# Patient Record
Sex: FEMALE | Race: WHITE | NOT HISPANIC OR LATINO
[De-identification: names, ages, dates, MRNs, and addresses within clinical notes are randomized per-mention and may not be internally consistent; named-entity substitution may affect disease eponyms.]

---

## 2017-07-25 ENCOUNTER — RESULT REVIEW (OUTPATIENT)
Age: 52
End: 2017-07-25

## 2018-08-23 ENCOUNTER — RESULT REVIEW (OUTPATIENT)
Age: 53
End: 2018-08-23

## 2018-11-06 ENCOUNTER — TRANSCRIPTION ENCOUNTER (OUTPATIENT)
Age: 53
End: 2018-11-06

## 2019-10-06 ENCOUNTER — RESULT REVIEW (OUTPATIENT)
Age: 54
End: 2019-10-06

## 2019-11-11 ENCOUNTER — RESULT REVIEW (OUTPATIENT)
Age: 54
End: 2019-11-11

## 2019-11-13 ENCOUNTER — INBOUND DOCUMENT (OUTPATIENT)
Age: 54
End: 2019-11-13

## 2019-12-04 ENCOUNTER — APPOINTMENT (OUTPATIENT)
Dept: OTOLARYNGOLOGY | Facility: CLINIC | Age: 54
End: 2019-12-04

## 2020-02-19 ENCOUNTER — APPOINTMENT (OUTPATIENT)
Dept: PEDIATRIC MEDICAL GENETICS | Facility: CLINIC | Age: 55
End: 2020-02-19

## 2020-02-20 LAB
ANION GAP SERPL CALC-SCNC: 17 MMOL/L
BUN SERPL-MCNC: 17 MG/DL
CALCIUM SERPL-MCNC: 9.4 MG/DL
CHLORIDE SERPL-SCNC: 104 MMOL/L
CO2 SERPL-SCNC: 22 MMOL/L
CREAT SERPL-MCNC: 0.72 MG/DL
GLUCOSE SERPL-MCNC: 69 MG/DL
POTASSIUM SERPL-SCNC: 4.2 MMOL/L
SODIUM SERPL-SCNC: 144 MMOL/L

## 2020-08-30 ENCOUNTER — RESULT REVIEW (OUTPATIENT)
Age: 55
End: 2020-08-30

## 2021-06-29 ENCOUNTER — RESULT REVIEW (OUTPATIENT)
Age: 56
End: 2021-06-29

## 2022-11-08 ENCOUNTER — APPOINTMENT (OUTPATIENT)
Dept: OBGYN | Facility: CLINIC | Age: 57
End: 2022-11-08

## 2022-11-27 ENCOUNTER — RESULT REVIEW (OUTPATIENT)
Age: 57
End: 2022-11-27

## 2022-11-28 ENCOUNTER — APPOINTMENT (OUTPATIENT)
Dept: OBGYN | Facility: CLINIC | Age: 57
End: 2022-11-28

## 2022-11-28 PROCEDURE — 76856 US EXAM PELVIC COMPLETE: CPT | Mod: 59

## 2022-11-28 PROCEDURE — 76830 TRANSVAGINAL US NON-OB: CPT

## 2022-11-28 PROCEDURE — 99396 PREV VISIT EST AGE 40-64: CPT | Mod: 25

## 2022-11-28 PROCEDURE — 81002 URINALYSIS NONAUTO W/O SCOPE: CPT

## 2023-11-30 ENCOUNTER — APPOINTMENT (OUTPATIENT)
Dept: OBGYN | Facility: CLINIC | Age: 58
End: 2023-11-30
Payer: COMMERCIAL

## 2023-11-30 PROCEDURE — 82270 OCCULT BLOOD FECES: CPT

## 2023-11-30 PROCEDURE — 81002 URINALYSIS NONAUTO W/O SCOPE: CPT

## 2023-11-30 PROCEDURE — 76830 TRANSVAGINAL US NON-OB: CPT

## 2023-11-30 PROCEDURE — 99396 PREV VISIT EST AGE 40-64: CPT | Mod: 25

## 2024-06-25 ENCOUNTER — APPOINTMENT (OUTPATIENT)
Dept: OBGYN | Facility: CLINIC | Age: 59
End: 2024-06-25
Payer: COMMERCIAL

## 2024-06-25 PROCEDURE — 76830 TRANSVAGINAL US NON-OB: CPT

## 2024-06-25 PROCEDURE — 99213 OFFICE O/P EST LOW 20 MIN: CPT | Mod: 25

## 2024-12-16 ENCOUNTER — APPOINTMENT (OUTPATIENT)
Dept: OBGYN | Facility: CLINIC | Age: 59
End: 2024-12-16

## 2025-01-08 ENCOUNTER — EMERGENCY (EMERGENCY)
Facility: HOSPITAL | Age: 60
LOS: 0 days | Discharge: ANOTHER HOSPITAL | End: 2025-01-08
Attending: STUDENT IN AN ORGANIZED HEALTH CARE EDUCATION/TRAINING PROGRAM
Payer: COMMERCIAL

## 2025-01-08 ENCOUNTER — INPATIENT (INPATIENT)
Facility: HOSPITAL | Age: 60
LOS: 1 days | Discharge: HOME | DRG: 312 | End: 2025-01-10
Attending: INTERNAL MEDICINE | Admitting: INTERNAL MEDICINE
Payer: COMMERCIAL

## 2025-01-08 VITALS
RESPIRATION RATE: 20 BRPM | HEART RATE: 69 BPM | SYSTOLIC BLOOD PRESSURE: 122 MMHG | OXYGEN SATURATION: 98 % | HEIGHT: 60 IN | WEIGHT: 111.99 LBS | TEMPERATURE: 98 F | DIASTOLIC BLOOD PRESSURE: 79 MMHG

## 2025-01-08 VITALS
WEIGHT: 113.1 LBS | SYSTOLIC BLOOD PRESSURE: 126 MMHG | DIASTOLIC BLOOD PRESSURE: 76 MMHG | TEMPERATURE: 98 F | RESPIRATION RATE: 20 BRPM | OXYGEN SATURATION: 100 % | HEART RATE: 75 BPM | HEIGHT: 60 IN

## 2025-01-08 VITALS
HEART RATE: 79 BPM | OXYGEN SATURATION: 96 % | DIASTOLIC BLOOD PRESSURE: 62 MMHG | SYSTOLIC BLOOD PRESSURE: 118 MMHG | TEMPERATURE: 98 F | RESPIRATION RATE: 20 BRPM

## 2025-01-08 DIAGNOSIS — R42 DIZZINESS AND GIDDINESS: ICD-10-CM

## 2025-01-08 DIAGNOSIS — R11.0 NAUSEA: ICD-10-CM

## 2025-01-08 DIAGNOSIS — R51.9 HEADACHE, UNSPECIFIED: ICD-10-CM

## 2025-01-08 DIAGNOSIS — R79.89 OTHER SPECIFIED ABNORMAL FINDINGS OF BLOOD CHEMISTRY: ICD-10-CM

## 2025-01-08 DIAGNOSIS — R55 SYNCOPE AND COLLAPSE: ICD-10-CM

## 2025-01-08 LAB
ALBUMIN SERPL ELPH-MCNC: 3.6 G/DL — SIGNIFICANT CHANGE UP (ref 3.3–5)
ALBUMIN SERPL ELPH-MCNC: 3.6 G/DL — SIGNIFICANT CHANGE UP (ref 3.3–5)
ALP SERPL-CCNC: 81 U/L — SIGNIFICANT CHANGE UP (ref 40–120)
ALP SERPL-CCNC: 82 U/L — SIGNIFICANT CHANGE UP (ref 40–120)
ALT FLD-CCNC: 25 U/L — SIGNIFICANT CHANGE UP (ref 10–45)
ALT FLD-CCNC: 29 U/L — SIGNIFICANT CHANGE UP (ref 12–78)
ANION GAP SERPL CALC-SCNC: 12 MMOL/L — SIGNIFICANT CHANGE UP (ref 5–17)
ANION GAP SERPL CALC-SCNC: 8 MMOL/L — SIGNIFICANT CHANGE UP (ref 5–17)
APTT BLD: 28.5 SEC — SIGNIFICANT CHANGE UP (ref 24.5–35.6)
APTT BLD: 30 SEC — SIGNIFICANT CHANGE UP (ref 24.5–35.6)
AST SERPL-CCNC: 26 U/L — SIGNIFICANT CHANGE UP (ref 15–37)
AST SERPL-CCNC: 29 U/L — SIGNIFICANT CHANGE UP (ref 10–40)
BASOPHILS # BLD AUTO: 0.05 K/UL — SIGNIFICANT CHANGE UP (ref 0–0.2)
BASOPHILS NFR BLD AUTO: 0.6 % — SIGNIFICANT CHANGE UP (ref 0–2)
BILIRUB SERPL-MCNC: 0.4 MG/DL — SIGNIFICANT CHANGE UP (ref 0.2–1.2)
BILIRUB SERPL-MCNC: 0.5 MG/DL — SIGNIFICANT CHANGE UP (ref 0.2–1.2)
BUN SERPL-MCNC: 11 MG/DL — SIGNIFICANT CHANGE UP (ref 7–23)
BUN SERPL-MCNC: 15 MG/DL — SIGNIFICANT CHANGE UP (ref 7–23)
CALCIUM SERPL-MCNC: 8.7 MG/DL — SIGNIFICANT CHANGE UP (ref 8.5–10.1)
CALCIUM SERPL-MCNC: 9.2 MG/DL — SIGNIFICANT CHANGE UP (ref 8.4–10.5)
CHLORIDE SERPL-SCNC: 106 MMOL/L — SIGNIFICANT CHANGE UP (ref 96–108)
CHLORIDE SERPL-SCNC: 109 MMOL/L — HIGH (ref 96–108)
CK MB BLD-MCNC: 1.8 % — SIGNIFICANT CHANGE UP (ref 0–3.5)
CK MB CFR SERPL CALC: 2.2 NG/ML — SIGNIFICANT CHANGE UP (ref 0.5–3.6)
CK SERPL-CCNC: 122 U/L — SIGNIFICANT CHANGE UP (ref 26–192)
CO2 SERPL-SCNC: 20 MMOL/L — LOW (ref 22–31)
CO2 SERPL-SCNC: 25 MMOL/L — SIGNIFICANT CHANGE UP (ref 22–31)
CREAT SERPL-MCNC: 0.6 MG/DL — SIGNIFICANT CHANGE UP (ref 0.5–1.3)
CREAT SERPL-MCNC: 0.76 MG/DL — SIGNIFICANT CHANGE UP (ref 0.5–1.3)
EGFR: 103 ML/MIN/1.73M2 — SIGNIFICANT CHANGE UP
EGFR: 90 ML/MIN/1.73M2 — SIGNIFICANT CHANGE UP
EOSINOPHIL # BLD AUTO: 0.06 K/UL — SIGNIFICANT CHANGE UP (ref 0–0.5)
EOSINOPHIL NFR BLD AUTO: 0.7 % — SIGNIFICANT CHANGE UP (ref 0–6)
FLUAV AG NPH QL: SIGNIFICANT CHANGE UP
FLUBV AG NPH QL: SIGNIFICANT CHANGE UP
GLUCOSE SERPL-MCNC: 115 MG/DL — HIGH (ref 70–99)
GLUCOSE SERPL-MCNC: 130 MG/DL — HIGH (ref 70–99)
HCT VFR BLD CALC: 37.3 % — SIGNIFICANT CHANGE UP (ref 34.5–45)
HGB BLD-MCNC: 12.5 G/DL — SIGNIFICANT CHANGE UP (ref 11.5–15.5)
IMM GRANULOCYTES NFR BLD AUTO: 0.6 % — SIGNIFICANT CHANGE UP (ref 0–0.9)
INR BLD: 1.01 RATIO — SIGNIFICANT CHANGE UP (ref 0.85–1.16)
INR BLD: 1.04 RATIO — SIGNIFICANT CHANGE UP (ref 0.85–1.16)
LYMPHOCYTES # BLD AUTO: 1.86 K/UL — SIGNIFICANT CHANGE UP (ref 1–3.3)
LYMPHOCYTES # BLD AUTO: 21.9 % — SIGNIFICANT CHANGE UP (ref 13–44)
MAGNESIUM SERPL-MCNC: 2 MG/DL — SIGNIFICANT CHANGE UP (ref 1.6–2.6)
MAGNESIUM SERPL-MCNC: 2.1 MG/DL — SIGNIFICANT CHANGE UP (ref 1.6–2.6)
MCHC RBC-ENTMCNC: 29.6 PG — SIGNIFICANT CHANGE UP (ref 27–34)
MCHC RBC-ENTMCNC: 33.5 G/DL — SIGNIFICANT CHANGE UP (ref 32–36)
MCV RBC AUTO: 88.2 FL — SIGNIFICANT CHANGE UP (ref 80–100)
MONOCYTES # BLD AUTO: 0.54 K/UL — SIGNIFICANT CHANGE UP (ref 0–0.9)
MONOCYTES NFR BLD AUTO: 6.3 % — SIGNIFICANT CHANGE UP (ref 2–14)
NEUTROPHILS # BLD AUTO: 5.95 K/UL — SIGNIFICANT CHANGE UP (ref 1.8–7.4)
NEUTROPHILS NFR BLD AUTO: 69.9 % — SIGNIFICANT CHANGE UP (ref 43–77)
NRBC # BLD: 0 /100 WBCS — SIGNIFICANT CHANGE UP (ref 0–0)
PLATELET # BLD AUTO: 227 K/UL — SIGNIFICANT CHANGE UP (ref 150–400)
POTASSIUM SERPL-MCNC: 3 MMOL/L — LOW (ref 3.5–5.3)
POTASSIUM SERPL-MCNC: 3.8 MMOL/L — SIGNIFICANT CHANGE UP (ref 3.5–5.3)
POTASSIUM SERPL-SCNC: 3 MMOL/L — LOW (ref 3.5–5.3)
POTASSIUM SERPL-SCNC: 3.8 MMOL/L — SIGNIFICANT CHANGE UP (ref 3.5–5.3)
PROT SERPL-MCNC: 6.9 G/DL — SIGNIFICANT CHANGE UP (ref 6–8.3)
PROT SERPL-MCNC: 7.6 GM/DL — SIGNIFICANT CHANGE UP (ref 6–8.3)
PROTHROM AB SERPL-ACNC: 11.4 SEC — SIGNIFICANT CHANGE UP (ref 9.9–13.4)
PROTHROM AB SERPL-ACNC: 11.7 SEC — SIGNIFICANT CHANGE UP (ref 9.9–13.4)
RBC # BLD: 4.23 M/UL — SIGNIFICANT CHANGE UP (ref 3.8–5.2)
RBC # FLD: 11.9 % — SIGNIFICANT CHANGE UP (ref 10.3–14.5)
RSV RNA NPH QL NAA+NON-PROBE: SIGNIFICANT CHANGE UP
SARS-COV-2 RNA SPEC QL NAA+PROBE: SIGNIFICANT CHANGE UP
SODIUM SERPL-SCNC: 139 MMOL/L — SIGNIFICANT CHANGE UP (ref 135–145)
SODIUM SERPL-SCNC: 141 MMOL/L — SIGNIFICANT CHANGE UP (ref 135–145)
TROPONIN I, HIGH SENSITIVITY RESULT: 848.8 NG/L — HIGH
TROPONIN I, HIGH SENSITIVITY RESULT: 865.5 NG/L — HIGH
WBC # BLD: 8.51 K/UL — SIGNIFICANT CHANGE UP (ref 3.8–10.5)
WBC # FLD AUTO: 8.51 K/UL — SIGNIFICANT CHANGE UP (ref 3.8–10.5)

## 2025-01-08 PROCEDURE — 99291 CRITICAL CARE FIRST HOUR: CPT

## 2025-01-08 PROCEDURE — 93010 ELECTROCARDIOGRAM REPORT: CPT

## 2025-01-08 PROCEDURE — 99285 EMERGENCY DEPT VISIT HI MDM: CPT | Mod: 25

## 2025-01-08 PROCEDURE — 71045 X-RAY EXAM CHEST 1 VIEW: CPT | Mod: 26

## 2025-01-08 PROCEDURE — 70450 CT HEAD/BRAIN W/O DYE: CPT | Mod: 26

## 2025-01-08 RX ORDER — HEPARIN SODIUM 1000 [USP'U]/ML
3200 INJECTION, SOLUTION INTRAVENOUS; SUBCUTANEOUS EVERY 6 HOURS
Refills: 0 | Status: DISCONTINUED | OUTPATIENT
Start: 2025-01-08 | End: 2025-01-08

## 2025-01-08 RX ORDER — SODIUM CHLORIDE 9 MG/ML
1000 INJECTION, SOLUTION INTRAMUSCULAR; INTRAVENOUS; SUBCUTANEOUS ONCE
Refills: 0 | Status: COMPLETED | OUTPATIENT
Start: 2025-01-08 | End: 2025-01-08

## 2025-01-08 RX ORDER — POTASSIUM CHLORIDE 600 MG/1
40 TABLET, FILM COATED, EXTENDED RELEASE ORAL ONCE
Refills: 0 | Status: COMPLETED | OUTPATIENT
Start: 2025-01-08 | End: 2025-01-08

## 2025-01-08 RX ORDER — METOCLOPRAMIDE 10 MG/1
10 TABLET ORAL ONCE
Refills: 0 | Status: COMPLETED | OUTPATIENT
Start: 2025-01-08 | End: 2025-01-08

## 2025-01-08 RX ORDER — ASPIRIN 81 MG
324 TABLET, DELAYED RELEASE (ENTERIC COATED) ORAL ONCE
Refills: 0 | Status: COMPLETED | OUTPATIENT
Start: 2025-01-08 | End: 2025-01-08

## 2025-01-08 RX ORDER — HEPARIN SODIUM 1000 [USP'U]/ML
3200 INJECTION, SOLUTION INTRAVENOUS; SUBCUTANEOUS EVERY 6 HOURS
Refills: 0 | Status: DISCONTINUED | OUTPATIENT
Start: 2025-01-08 | End: 2025-01-09

## 2025-01-08 RX ORDER — HEPARIN SODIUM 1000 [USP'U]/ML
3200 INJECTION, SOLUTION INTRAVENOUS; SUBCUTANEOUS ONCE
Refills: 0 | Status: COMPLETED | OUTPATIENT
Start: 2025-01-08 | End: 2025-01-08

## 2025-01-08 RX ORDER — HEPARIN SODIUM 1000 [USP'U]/ML
INJECTION, SOLUTION INTRAVENOUS; SUBCUTANEOUS
Qty: 25000 | Refills: 0 | Status: DISCONTINUED | OUTPATIENT
Start: 2025-01-08 | End: 2025-01-08

## 2025-01-08 RX ORDER — POTASSIUM CHLORIDE 600 MG/1
10 TABLET, FILM COATED, EXTENDED RELEASE ORAL ONCE
Refills: 0 | Status: DISCONTINUED | OUTPATIENT
Start: 2025-01-08 | End: 2025-01-08

## 2025-01-08 RX ORDER — ACETAMINOPHEN 80 MG/.8ML
750 SOLUTION/ DROPS ORAL ONCE
Refills: 0 | Status: COMPLETED | OUTPATIENT
Start: 2025-01-08 | End: 2025-01-08

## 2025-01-08 RX ORDER — HEPARIN SODIUM 1000 [USP'U]/ML
INJECTION, SOLUTION INTRAVENOUS; SUBCUTANEOUS
Qty: 25000 | Refills: 0 | Status: DISCONTINUED | OUTPATIENT
Start: 2025-01-08 | End: 2025-01-09

## 2025-01-08 RX ADMIN — HEPARIN SODIUM 3200 UNIT(S): 1000 INJECTION, SOLUTION INTRAVENOUS; SUBCUTANEOUS at 20:03

## 2025-01-08 RX ADMIN — Medication 324 MILLIGRAM(S): at 20:03

## 2025-01-08 RX ADMIN — ACETAMINOPHEN 300 MILLIGRAM(S): 80 SOLUTION/ DROPS ORAL at 17:56

## 2025-01-08 RX ADMIN — METOCLOPRAMIDE 10 MILLIGRAM(S): 10 TABLET ORAL at 17:56

## 2025-01-08 RX ADMIN — SODIUM CHLORIDE 1000 MILLILITER(S): 9 INJECTION, SOLUTION INTRAMUSCULAR; INTRAVENOUS; SUBCUTANEOUS at 17:56

## 2025-01-08 RX ADMIN — HEPARIN SODIUM 650 UNIT(S)/HR: 1000 INJECTION, SOLUTION INTRAVENOUS; SUBCUTANEOUS at 20:07

## 2025-01-08 RX ADMIN — POTASSIUM CHLORIDE 40 MILLIEQUIVALENT(S): 600 TABLET, FILM COATED, EXTENDED RELEASE ORAL at 20:03

## 2025-01-08 NOTE — ED PEDIATRIC NURSE REASSESSMENT NOTE - NS ED NURSE REASSESS COMMENT FT2
Patient APTT drawn before leaving [facility]. APTT was 30 platelets were over 200. Pharmacy called to verify heparin to continue at same rate. new APtt will be drawn at 0140 6 hours after heparin started at [place].

## 2025-01-08 NOTE — ED PROVIDER NOTE - ATTENDING CONTRIBUTION TO CARE
58 y/o female with no PMH here with syncope today fainted hit head in store, endorsed headache and dizziness, and nausea. troponin at [place] 865 CT head negative.  Was transferred For an NSTEMI on heparin signed out pending cards evaluation likely admission for further workup patient denies any chest pain shortness of breath no EKG changes noted.

## 2025-01-08 NOTE — ED ADULT NURSE NOTE - OBJECTIVE STATEMENT
59 y.o F BIB EMS as transfer from [facility] VS p/w c/o NSTEMI. A+OX4. Per pt states was [facility] this afternoon and had sudden onset dizziness a/w syncopal episode, positive head strike, negative CTs at [facility]. Reports had EKG and labs done showing elevated troponin levels and NSTEMI, sent to [facility] for further cardiology evaluation. Denies any current CP, SOB, CASTRO, f/c, n/v/d, dizziness/ lightheadedness at rest, abd pain, urinary sx. States family at home has flu, but just tested negative x2 days ago, denies any sx. HR 72 sinus rhythm on monitor, heparin started at 1940 ACS nomogram @ 6.5 mL/hr at [facility]. 20g LAC. No pertinent cardiac hx, no other complaints at this time,  at bedside, comfort and safety maintained.

## 2025-01-08 NOTE — ED ADULT NURSE NOTE - OBJECTIVE STATEMENT
Patient received A&O x4 breathing unlabored complaining of headache post syncopal episodes while at [employer] picking up her kids flu medication. As per pt's  her family has been fighting the flu and patient has been the care taker. Patient complaining of chills, afebrile at this time, placed on cardiac monitor, continuos monitoring in place.

## 2025-01-08 NOTE — ED PROVIDER NOTE - OBJECTIVE STATEMENT
58 y/o female with no PMH here with syncope today. Pt was at the store when she fainted, hitting the back of her head. Pt currently repotrs having headache, dizziness and some nausea. Denies vomiting. Pt has been having URI symptoms for the last few days. (+) sick contact at home. Denies fever, chills.  chest pain, dyspnea. 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 Denies smoking, drinking, drugs. Pt saw cardiologist few months ago with negative workup.

## 2025-01-08 NOTE — ED ADULT NURSE NOTE - NSFALLRISKASMT_ED_ALL_ED_DT
08-Jan-2025 22:00 Saucerization Excision Additional Text (Leave Blank If You Do Not Want): The margin was drawn around the clinically apparent lesion.  Incisions were then made along these lines, in a tangential fashion, to the appropriate tissue plane and the lesion was extirpated.

## 2025-01-08 NOTE — ED PROVIDER NOTE - PROGRESS NOTE DETAILS
Attending Bj Major:  Patient signed out to me, transfer for Nstemi, on hep gtt, pending labs, cards eval for admission.

## 2025-01-08 NOTE — ED PROVIDER NOTE - CLINICAL SUMMARY MEDICAL DECISION MAKING FREE TEXT BOX
60 y/o female with no PMH here with syncopal episode today. Vs stable. Pt not hypoxic or tachycardiac.    Ddx include but not limited to vasovagl syncope, arrythmia.   Will obtain cardiac labs, ekg, ct head, pain meds and ep consult. 58 y/o female with no PMH here with syncopal episode today. Vs stable. Pt not hypoxic or tachycardiac.    Ddx include but not limited to vasovagal syncope, arrythmia.   Will obtain cardiac labs, ekg, ct head, pain meds and ep consult.    labs reviewed and notable K 3.0 Troponin 898. Will replete K  Ct head no acute findings.   Transfer center called regarding ekg findings and elevated troponin. Spoke with Dr Hooper who consulted ep and does not think it's wpw but recommend transfer due to elevated troponin. Also recommend heparin and asa. 58 y/o female with no PMH here with syncopal episode today. Vs stable. Pt not hypoxic or tachycardiac.    Ddx include but not limited to vasovagal syncope, arrythmia, acs   Will obtain cardiac labs, ekg, ct head, pain meds and ep consult.    labs reviewed and notable K 3.0 Troponin 898. Will replete K  Ct head no acute findings.   Transfer center called regarding ekg findings and elevated troponin. Spoke with Dr Hooper who consulted ep and does not think it's wpw but recommend transfer due to elevated troponin. Also recommend heparin and asa.

## 2025-01-08 NOTE — ED ADULT NURSE NOTE - CADM POA PRESS ULCER
Anti-infective Withdrawal Note    Antimicrobials:  vancomycin, piperacillin-tazobactam    Reason for withdrawal:  no evidence of infection    Communicated to: SICU team at 3:50pm    The primary team will have 24 hours (or in the case of after hours, holiday or weekend, the next business day) to appeal the decision to the Antimicrobial Stewardship Physician Leader. If the Antimicrobial Stewardship Physician Leader determines that use of the antimicrobial in question is still unjustified, a formal ID consultation will be recommended.    THIS IS NOT AN INFECTIOUS DISEASES CONSULTATION No

## 2025-01-08 NOTE — ED ADULT NURSE NOTE - ED STAT RN HANDOFF DETAILS
handoff report alejo caraballo to [facility] ED ESSENCE DEAN. All pending orders endorsed, safety precautions maintained. Pt updated on plan of care.

## 2025-01-08 NOTE — ED PROVIDER NOTE - CRITICAL CARE ATTENDING CONTRIBUTION TO CARE
Seen with SHOBHA Frazier    58yo female with no pmh presenting with syncope.  Was at [employer] picking up her order talking to another customer when this happened.  Was told she lost consciousness abruptly and fell hitting head on floor.  No ac use.  Has ha, never had cp, sob, palpitations.  No seizure activity.  Never happened before.  Has been having viral symptoms similar to other family members over past few days.  ECG reading wpw but normal pr, delta wave not very pronounced.  Will d/w ep.  Plan labs, tele, ct, reassess.    Trop 800s.  Patient cp free, no sob, normal vitals.  Doubt pe.  D/w cardiology, will transfer for further eval.    Upon my evaluation, this patient had a high probability of imminent or life-threatening deterioration due to nstemi, which required my direct attention, intervention, and personal management.  The patient has a  medical condition that impairs one or more vital organ systems.  Frequent personal assessment and adjustment of medical interventions was performed.      I have personally provided 40 minutes of critical care time exclusive of time spent on separately billable procedures. Time includes review of laboratory data, radiology results, discussion with consultants, patient and family; monitoring for potential decompensation, as well as time spent retrieving data and reviewing the chart and documenting the visit. Interventions were performed as documented above.

## 2025-01-08 NOTE — ED PROVIDER NOTE - CLINICAL SUMMARY MEDICAL DECISION MAKING FREE TEXT BOX
59 y f presented to [facility] for syncope w head strike and was found to have NSTEMI put on heparin ggt asa and transferred to [facility], cardiology team aware bedside, EKG non ischemic, low suspicion for ACS, will be adm for syncope workup.

## 2025-01-08 NOTE — ED PROVIDER NOTE - NSRISKOFTRANSFER_ED_A_ED
Use ondansetron as needed for nausea and vomiting.  Return to the emergency department for worsening symptoms, repeated episodes, abdominal pain, lightheadedness, or other concerns.  Otherwise follow-up in clinic.   Deterioration of Condition En Route/Death or Disability/Increased Pain/Transportation Risk (There is always a risk of traffic delays resulting in deterioration of condition.)

## 2025-01-08 NOTE — ED ADULT NURSE NOTE - GASTROINTESTINAL ASSESSMENT
FOLLOW-UP:  1. Please call to make a follow-up appointment with Dr. Bhakta this week. [phone]  2. Please follow up with your primary care physician in one week regarding your hospitalization. - - -

## 2025-01-08 NOTE — ED PROVIDER NOTE - OBJECTIVE STATEMENT
58 y/o female with no PMH here with syncope today fainted hit head in store, endorsed headache and dizziness, and nausea. troponin at [place] 865 CT head negative. she was given aspirin heparin and 1L IVF and reglan. on arrival denying any active sx of chest pain, n/v. EKG NSR, rsr' no interval prolongation.

## 2025-01-08 NOTE — ED PROVIDER NOTE - PHYSICAL EXAMINATION
GENERAL: well appearing in no acute distress, non-toxic appearing  HEAD: normocephalic, atraumatic  HEENT: normal conjunctiva, oral mucosa moist, uvula midline, no tonsilar exudates, neck supple, no JVD  CARDIAC: regular rate and rhythm, normal S1S2, no appreciable murmurs, 2+ pulses in UE/LE b/l  PULM: normal breath sounds, clear to ascultation bilaterally, no rales, rhonchi, wheezing  GI: abdomen nondistended, soft, nontender, no guarding, rebound tenderness  NEURO: no focal motor or sensory deficits,   MSK: no peripheral edema, no calf tenderness b/l

## 2025-01-08 NOTE — ED ADULT NURSE NOTE - NSFALLRISKINTERV_ED_ALL_ED

## 2025-01-08 NOTE — ED PROVIDER NOTE - CARE PLAN
Principal Discharge DX:	Syncope   1 Principal Discharge DX:	Syncope  Secondary Diagnosis:	Elevated troponin

## 2025-01-09 ENCOUNTER — RESULT REVIEW (OUTPATIENT)
Age: 60
End: 2025-01-09

## 2025-01-09 DIAGNOSIS — R55 SYNCOPE AND COLLAPSE: ICD-10-CM

## 2025-01-09 LAB
A1C WITH ESTIMATED AVERAGE GLUCOSE RESULT: 5.7 % — HIGH (ref 4–5.6)
A1C WITH ESTIMATED AVERAGE GLUCOSE RESULT: 5.7 % — HIGH (ref 4–5.6)
ADD ON TEST-SPECIMEN IN LAB: SIGNIFICANT CHANGE UP
ANION GAP SERPL CALC-SCNC: 11 MMOL/L — SIGNIFICANT CHANGE UP (ref 5–17)
APTT BLD: 67 SEC — HIGH (ref 24.5–35.6)
APTT BLD: 79.4 SEC — HIGH (ref 24.5–35.6)
BASOPHILS # BLD AUTO: 0.03 K/UL — SIGNIFICANT CHANGE UP (ref 0–0.2)
BASOPHILS NFR BLD AUTO: 0.3 % — SIGNIFICANT CHANGE UP (ref 0–2)
BUN SERPL-MCNC: 8 MG/DL — SIGNIFICANT CHANGE UP (ref 7–23)
CALCIUM SERPL-MCNC: 9.3 MG/DL — SIGNIFICANT CHANGE UP (ref 8.4–10.5)
CHLORIDE SERPL-SCNC: 109 MMOL/L — HIGH (ref 96–108)
CHOLEST SERPL-MCNC: 150 MG/DL — SIGNIFICANT CHANGE UP
CHOLEST SERPL-MCNC: 158 MG/DL — SIGNIFICANT CHANGE UP
CK MB BLD-MCNC: 3 % — SIGNIFICANT CHANGE UP (ref 0–3.5)
CK MB BLD-MCNC: 3.3 % — SIGNIFICANT CHANGE UP (ref 0–3.5)
CK MB CFR SERPL CALC: 3.6 NG/ML — SIGNIFICANT CHANGE UP (ref 0–3.8)
CK MB CFR SERPL CALC: 3.9 NG/ML — HIGH (ref 0–3.8)
CK SERPL-CCNC: 119 U/L — SIGNIFICANT CHANGE UP (ref 25–170)
CK SERPL-CCNC: 119 U/L — SIGNIFICANT CHANGE UP (ref 25–170)
CO2 SERPL-SCNC: 21 MMOL/L — LOW (ref 22–31)
CREAT SERPL-MCNC: 0.58 MG/DL — SIGNIFICANT CHANGE UP (ref 0.5–1.3)
EGFR: 104 ML/MIN/1.73M2 — SIGNIFICANT CHANGE UP
EOSINOPHIL # BLD AUTO: 0 K/UL — SIGNIFICANT CHANGE UP (ref 0–0.5)
EOSINOPHIL NFR BLD AUTO: 0 % — SIGNIFICANT CHANGE UP (ref 0–6)
ESTIMATED AVERAGE GLUCOSE: 117 MG/DL — HIGH (ref 68–114)
ESTIMATED AVERAGE GLUCOSE: 117 MG/DL — HIGH (ref 68–114)
GAS PNL BLDV: SIGNIFICANT CHANGE UP
GLUCOSE SERPL-MCNC: 109 MG/DL — HIGH (ref 70–99)
HCT VFR BLD CALC: 34.7 % — SIGNIFICANT CHANGE UP (ref 34.5–45)
HCT VFR BLD CALC: 35.1 % — SIGNIFICANT CHANGE UP (ref 34.5–45)
HCT VFR BLD CALC: 35.2 % — SIGNIFICANT CHANGE UP (ref 34.5–45)
HDLC SERPL-MCNC: 65 MG/DL — SIGNIFICANT CHANGE UP
HDLC SERPL-MCNC: 65 MG/DL — SIGNIFICANT CHANGE UP
HGB BLD-MCNC: 11.6 G/DL — SIGNIFICANT CHANGE UP (ref 11.5–15.5)
HGB BLD-MCNC: 11.7 G/DL — SIGNIFICANT CHANGE UP (ref 11.5–15.5)
HGB BLD-MCNC: 11.8 G/DL — SIGNIFICANT CHANGE UP (ref 11.5–15.5)
IMM GRANULOCYTES NFR BLD AUTO: 0.2 % — SIGNIFICANT CHANGE UP (ref 0–0.9)
INR BLD: 1.09 RATIO — SIGNIFICANT CHANGE UP (ref 0.85–1.16)
LIPID PNL WITH DIRECT LDL SERPL: 76 MG/DL — SIGNIFICANT CHANGE UP
LIPID PNL WITH DIRECT LDL SERPL: 82 MG/DL — SIGNIFICANT CHANGE UP
LYMPHOCYTES # BLD AUTO: 1.33 K/UL — SIGNIFICANT CHANGE UP (ref 1–3.3)
LYMPHOCYTES # BLD AUTO: 14.8 % — SIGNIFICANT CHANGE UP (ref 13–44)
MCHC RBC-ENTMCNC: 29.8 PG — SIGNIFICANT CHANGE UP (ref 27–34)
MCHC RBC-ENTMCNC: 30.1 PG — SIGNIFICANT CHANGE UP (ref 27–34)
MCHC RBC-ENTMCNC: 30.2 PG — SIGNIFICANT CHANGE UP (ref 27–34)
MCHC RBC-ENTMCNC: 33.3 G/DL — SIGNIFICANT CHANGE UP (ref 32–36)
MCHC RBC-ENTMCNC: 33.4 G/DL — SIGNIFICANT CHANGE UP (ref 32–36)
MCHC RBC-ENTMCNC: 33.5 G/DL — SIGNIFICANT CHANGE UP (ref 32–36)
MCV RBC AUTO: 89.3 FL — SIGNIFICANT CHANGE UP (ref 80–100)
MCV RBC AUTO: 90 FL — SIGNIFICANT CHANGE UP (ref 80–100)
MCV RBC AUTO: 90.1 FL — SIGNIFICANT CHANGE UP (ref 80–100)
MONOCYTES # BLD AUTO: 0.32 K/UL — SIGNIFICANT CHANGE UP (ref 0–0.9)
MONOCYTES NFR BLD AUTO: 3.6 % — SIGNIFICANT CHANGE UP (ref 2–14)
NEUTROPHILS # BLD AUTO: 7.26 K/UL — SIGNIFICANT CHANGE UP (ref 1.8–7.4)
NEUTROPHILS NFR BLD AUTO: 81.1 % — HIGH (ref 43–77)
NON HDL CHOLESTEROL: 85 MG/DL — SIGNIFICANT CHANGE UP
NON HDL CHOLESTEROL: 93 MG/DL — SIGNIFICANT CHANGE UP
NRBC # BLD: 0 /100 WBCS — SIGNIFICANT CHANGE UP (ref 0–0)
NT-PROBNP SERPL-SCNC: 198 PG/ML — SIGNIFICANT CHANGE UP (ref 0–300)
NT-PROBNP SERPL-SCNC: 457 PG/ML — HIGH (ref 0–300)
PLATELET # BLD AUTO: 200 K/UL — SIGNIFICANT CHANGE UP (ref 150–400)
PLATELET # BLD AUTO: 212 K/UL — SIGNIFICANT CHANGE UP (ref 150–400)
PLATELET # BLD AUTO: 219 K/UL — SIGNIFICANT CHANGE UP (ref 150–400)
POTASSIUM SERPL-MCNC: 3.7 MMOL/L — SIGNIFICANT CHANGE UP (ref 3.5–5.3)
POTASSIUM SERPL-SCNC: 3.7 MMOL/L — SIGNIFICANT CHANGE UP (ref 3.5–5.3)
PROTHROM AB SERPL-ACNC: 12.4 SEC — SIGNIFICANT CHANGE UP (ref 9.9–13.4)
RBC # BLD: 3.85 M/UL — SIGNIFICANT CHANGE UP (ref 3.8–5.2)
RBC # BLD: 3.91 M/UL — SIGNIFICANT CHANGE UP (ref 3.8–5.2)
RBC # BLD: 3.93 M/UL — SIGNIFICANT CHANGE UP (ref 3.8–5.2)
RBC # FLD: 11.9 % — SIGNIFICANT CHANGE UP (ref 10.3–14.5)
SODIUM SERPL-SCNC: 141 MMOL/L — SIGNIFICANT CHANGE UP (ref 135–145)
TRIGL SERPL-MCNC: 40 MG/DL — SIGNIFICANT CHANGE UP
TRIGL SERPL-MCNC: 47 MG/DL — SIGNIFICANT CHANGE UP
TROPONIN T, HIGH SENSITIVITY RESULT: 55 NG/L — HIGH (ref 0–51)
TROPONIN T, HIGH SENSITIVITY RESULT: 57 NG/L — HIGH (ref 0–51)
TROPONIN T, HIGH SENSITIVITY RESULT: 66 NG/L — HIGH (ref 0–51)
TROPONIN T, HIGH SENSITIVITY RESULT: 66 NG/L — HIGH (ref 0–51)
TSH SERPL-MCNC: 1.62 UIU/ML — SIGNIFICANT CHANGE UP (ref 0.27–4.2)
TSH SERPL-MCNC: 3.36 UIU/ML — SIGNIFICANT CHANGE UP (ref 0.27–4.2)
TSH SERPL-MCNC: 3.43 UIU/ML — SIGNIFICANT CHANGE UP (ref 0.27–4.2)
WBC # BLD: 6.38 K/UL — SIGNIFICANT CHANGE UP (ref 3.8–10.5)
WBC # BLD: 7.31 K/UL — SIGNIFICANT CHANGE UP (ref 3.8–10.5)
WBC # BLD: 8.96 K/UL — SIGNIFICANT CHANGE UP (ref 3.8–10.5)
WBC # FLD AUTO: 6.38 K/UL — SIGNIFICANT CHANGE UP (ref 3.8–10.5)
WBC # FLD AUTO: 7.31 K/UL — SIGNIFICANT CHANGE UP (ref 3.8–10.5)
WBC # FLD AUTO: 8.96 K/UL — SIGNIFICANT CHANGE UP (ref 3.8–10.5)

## 2025-01-09 PROCEDURE — 93356 MYOCRD STRAIN IMG SPCKL TRCK: CPT

## 2025-01-09 PROCEDURE — 99152 MOD SED SAME PHYS/QHP 5/>YRS: CPT

## 2025-01-09 PROCEDURE — 99253 IP/OBS CNSLTJ NEW/EST LOW 45: CPT | Mod: GC

## 2025-01-09 PROCEDURE — 99223 1ST HOSP IP/OBS HIGH 75: CPT | Mod: GC

## 2025-01-09 PROCEDURE — 93306 TTE W/DOPPLER COMPLETE: CPT | Mod: 26

## 2025-01-09 PROCEDURE — 76376 3D RENDER W/INTRP POSTPROCES: CPT | Mod: 26

## 2025-01-09 PROCEDURE — 70553 MRI BRAIN STEM W/O & W/DYE: CPT | Mod: 26

## 2025-01-09 PROCEDURE — 93458 L HRT ARTERY/VENTRICLE ANGIO: CPT | Mod: 26

## 2025-01-09 RX ORDER — ACETAMINOPHEN 80 MG/.8ML
650 SOLUTION/ DROPS ORAL EVERY 6 HOURS
Refills: 0 | Status: DISCONTINUED | OUTPATIENT
Start: 2025-01-09 | End: 2025-01-10

## 2025-01-09 RX ORDER — HEPARIN SODIUM 1000 [USP'U]/ML
INJECTION, SOLUTION INTRAVENOUS; SUBCUTANEOUS
Qty: 25000 | Refills: 0 | Status: DISCONTINUED | OUTPATIENT
Start: 2025-01-09 | End: 2025-01-09

## 2025-01-09 RX ORDER — ASPIRIN 81 MG
81 TABLET, DELAYED RELEASE (ENTERIC COATED) ORAL DAILY
Refills: 0 | Status: DISCONTINUED | OUTPATIENT
Start: 2025-01-09 | End: 2025-01-10

## 2025-01-09 RX ADMIN — HEPARIN SODIUM 650 UNIT(S)/HR: 1000 INJECTION, SOLUTION INTRAVENOUS; SUBCUTANEOUS at 07:00

## 2025-01-09 RX ADMIN — Medication 81 MILLIGRAM(S): at 10:22

## 2025-01-09 RX ADMIN — HEPARIN SODIUM 650 UNIT(S)/HR: 1000 INJECTION, SOLUTION INTRAVENOUS; SUBCUTANEOUS at 00:55

## 2025-01-09 RX ADMIN — HEPARIN SODIUM 650 UNIT(S)/HR: 1000 INJECTION, SOLUTION INTRAVENOUS; SUBCUTANEOUS at 09:49

## 2025-01-09 RX ADMIN — HEPARIN SODIUM 650 UNIT(S)/HR: 1000 INJECTION, SOLUTION INTRAVENOUS; SUBCUTANEOUS at 03:02

## 2025-01-09 RX ADMIN — ACETAMINOPHEN 650 MILLIGRAM(S): 80 SOLUTION/ DROPS ORAL at 10:22

## 2025-01-09 RX ADMIN — HEPARIN SODIUM UNIT(S)/HR: 1000 INJECTION, SOLUTION INTRAVENOUS; SUBCUTANEOUS at 03:02

## 2025-01-09 NOTE — H&P ADULT - NSHPLABSRESULTS_GEN_ALL_CORE
11.7   6.38  )-----------( 200      ( 09 Jan 2025 08:50 )             35.1       01-09    141  |  109[H]  |  8   ----------------------------<  109[H]  3.7   |  21[L]  |  0.58    Ca    9.3      09 Jan 2025 06:16  Mg     2.0     01-08    TPro  6.9  /  Alb  3.6  /  TBili  0.4  /  DBili  x   /  AST  29  /  ALT  25  /  AlkPhos  82  01-08              Urinalysis Basic - ( 09 Jan 2025 06:16 )    Color: x / Appearance: x / SG: x / pH: x  Gluc: 109 mg/dL / Ketone: x  / Bili: x / Urobili: x   Blood: x / Protein: x / Nitrite: x   Leuk Esterase: x / RBC: x / WBC x   Sq Epi: x / Non Sq Epi: x / Bacteria: x        PT/INR - ( 09 Jan 2025 02:32 )   PT: 12.4 sec;   INR: 1.09 ratio         PTT - ( 09 Jan 2025 08:50 )  PTT:67.0 sec    CARDIAC MARKERS ( 09 Jan 2025 06:16 )  x     / x     / x     / x     / 3.6 ng/mL  CARDIAC MARKERS ( 08 Jan 2025 23:30 )  x     / x     / x     / x     / 3.3 ng/mL      EKG SR T inv inf leads

## 2025-01-09 NOTE — CONSULT NOTE ADULT - ASSESSMENT
59F presenting with syncope iso dehydration. Neuro exam nonfocal. CTH neg. Given Fabry disease trait, would r/o stroke.     Impression: Syncope likely iso dehydration. R/o neurologic etiology given Fabry trait increases risk of stroke     Recommendations:   MRI brain w/wo  vEEG    Seen and d/w Dr. Mcrae

## 2025-01-09 NOTE — H&P ADULT - HISTORY OF PRESENT ILLNESS
60 y/o female with no PMH here with syncope today fainted hit head in store, endorsed headache and dizziness, and nausea. troponin at [place] 865 CT head negative. she was given aspirin heparin and 1L IVF and reglan. on arrival denying any active sx of chest pain, n/v. EKG NSR, rsr' no interval prolongation. Transferred to [facility] for further work up. s/p recent URI.  at bedside.

## 2025-01-09 NOTE — H&P ADULT - NSHPSOCIALHISTORY_GEN_ALL_CORE
Social History:    Marital Status:  (  x )    (   ) Single    (   )    (  )   Occupation: teacher aid  Lives with: (  ) alone  (  ) children   (x  ) spouse   (  ) parents  (  ) other    Substance Use (street drugs): ( x) never used  (  ) other:  Tobacco Usage:  ( x ) never smoked   (   ) former smoker   (   ) current smoker  (     ) pack years  (        ) last cigarette date  Alcohol Usage: social    (     ) Advanced Directives: (     ) None    (      ) DNR    (     ) DNI    (     ) Health Care Proxy:

## 2025-01-09 NOTE — CONSULT NOTE ADULT - SUBJECTIVE AND OBJECTIVE BOX
Cardiology Consult Note   [Please check [email or web address] password: [email or web address]" for cardiology service schedule and contact information]    HPI:  Flavia Álvarez is a 59 year old Female with no PMH who presented to [place] for syncope and found to have elevated troponin.    Patient is typically "healthy", fully functional, and without issues. Patient states ***      ROS:  General: no fatigue, no lethargy  HEENT: No cough, no congestion  CV: as per HPI  Resp: as per HPI  GI: No nausea, no vomiting  MSK: no extremity pain, no decreased ROM  Skin: no rashes, no bruising  Neuro: no confusion, no focal deficits  Psych: no significant anxiety, no hallucinations    PAST MEDICAL & SURGICAL HISTORY:    FAMILY HISTORY:    SOCIAL HISTORY:  unchanged    MEDICATIONS:  heparin   Injectable 3200 Unit(s) IV Push every 6 hours PRN  heparin  Infusion.  Unit(s)/Hr IV Continuous <Continuous>                    -------------------------------------------------------------------------------------------  PHYSICAL EXAM:  T(C): 36.8 (01-08-25 @ 21:34), Max: 36.8 (01-08-25 @ 21:34)  HR: 72 (01-08-25 @ 22:42) (72 - 75)  BP: 98/61 (01-08-25 @ 22:42) (98/61 - 126/76)  RR: 17 (01-08-25 @ 22:42) (17 - 20)  SpO2: 100% (01-08-25 @ 22:42) (100% - 100%)  Wt(kg): --  I&O's Summary      GENERAL: Patient is alert, awake, and in no acute distress  HEENT: Moist mucous membranes. No conjunctival injection. No JVD  CHEST/LUNG: Clear to auscultation bilaterally; No wheezing. Unlabored respirations.  HEART: Regular rate and rhythm; No murmurs. Radial pulses 2+.  ABDOMEN: Bowel sounds present; Soft, Nontender, Nondistended.   EXTREMITIES:  No lower extremity tenderness. No lower extremity edema.  NEURO: Aox3, no focal deficits    -------------------------------------------------------------------------------------------  LABS:                          11.8   8.96  )-----------( 219      ( 08 Jan 2025 23:30 )             35.2     01-08    141  |  109[H]  |  11  ----------------------------<  115[H]  3.8   |  20[L]  |  0.60    Ca    9.2      08 Jan 2025 23:30  Mg     2.0     01-08    TPro  6.9  /  Alb  3.6  /  TBili  0.4  /  DBili  x   /  AST  29  /  ALT  25  /  AlkPhos  82  01-08    PT/INR - ( 09 Jan 2025 02:32 )   PT: 12.4 sec;   INR: 1.09 ratio         PTT - ( 09 Jan 2025 02:32 )  PTT:79.4 sec  CARDIAC MARKERS ( 09 Jan 2025 02:32 )  55 ng/L / x     / x     / x     / x     / x      CARDIAC MARKERS ( 08 Jan 2025 23:30 )  57 ng/L / x     / x     / x     / x     / 3.3 ng/mL              -------------------------------------------------------------------------------------------    -------------------------------------------------------------------------------------------    Assessment and Recommendations:        Recs:  - load patient w/ ASA 325mg if not done, then continue with ASA 81mg daily  - load patient w/ ticagrelor 180mg and then continue with ticagrelor 90 mg BID  - give heparin bolus and gtt (use ACS order set)  - trend troponins q2-4 hours to peak, obtain CK, CKMB, CKMB%  - obtain proBNP  - repeat ECG with next troponin  - atorvastatin 80 mg PO daily  - obtain formal TTE  - please obtain A1C, Lipid profile, LPa, TSH  - replete lytes for K >4.0, Mg >2.0  - start the patient on metoprolol tartrate 12.5mg BID and uptitrate as tolerated  - will consider starting ACE/ARB pending clinical course and plan for ACS management  - monitor on telemetry  - cardiac rehab on discharge  - nutrition consult for risk modification   - smoking cessation counseling  - please notify cardiology team if worsening chest pain, hemodynamic instability  - maintain active T&S, Hgb > 8; will monitor for s/s of bleeding           These are preliminary recommendations. Please see attending attestation for final recommendations.  Cardiology Consult Note   [Please check [email or web address] password: [email or web address]" for cardiology service schedule and contact information]    HPI:  Flavia Álvarez is a 59 year old Female with no PMH who presented to [place] for syncope and found to have elevated troponin.    Patient is typically "healthy", fully functional, and without issues, but she states for the past couple of weeks she has been having dry cough, rhinorrhea, congestion, and overall fatigue. Thinks she had a viral illness during this time, and her PO intake has been a bit decreased. This AM she didn't drink or eat anything and was feeling a bit woozy when she went to the [facility]. While waiting for her food, she felt a little lightheaded and next thing she knew, she was on the floor, having hit her head and lost consciousness. Unknown how long she was down for. EMS was called and patient went to [place] where she had CT head that was negative, ECG which documented "WPW" however I discussed this with the Electrophysiologist on call who did not think there was any pre-excitation. ECG did show TWI in anterolateral leads. Patient had a troponin that was elevated at 848 and so Cardiology at [facility] was called.     Given possibility that ACS lead to her syncopal episode, decision made to start heparin gtt and load with Aspirin. A repeat troponin and a CK + CPK% were requested prior to transfer to [facility]. Repeat troponin was also ~850 and CPK% <3. Upon arrival at [facility], patient is hemodynamically stable and asymptomatic. ECG done on arrival with similar unchanged TWI in anterolateral leads but now more diffuse TWI including the inferior leads. She denies any shortness of breath, orthopnea, leg swelling, leg pain, fevers, chills, chest pain, abdominal pain, neurologic deficits, history of seizures, history of exertional syncope.     Of note, patient has Fabry gene trait but not the disease.    ROS:  General: + fatigue, no lethargy  HEENT: + cough, no congestion  CV: as per HPI  Resp: as per HPI  GI: No nausea, no vomiting  MSK: no extremity pain, no decreased ROM  Skin: no rashes, no bruising  Neuro: no confusion, no focal deficits  Psych: no significant anxiety, no hallucinations    PAST MEDICAL & SURGICAL HISTORY:    FAMILY HISTORY:     SOCIAL HISTORY:  No EtOH, illicit drug, or tobacco use     MEDICATIONS:  heparin   Injectable 3200 Unit(s) IV Push every 6 hours PRN  heparin  Infusion.  Unit(s)/Hr IV Continuous <Continuous>                    -------------------------------------------------------------------------------------------  PHYSICAL EXAM:  T(C): 36.8 (01-08-25 @ 21:34), Max: 36.8 (01-08-25 @ 21:34)  HR: 72 (01-08-25 @ 22:42) (72 - 75)  BP: 98/61 (01-08-25 @ 22:42) (98/61 - 126/76)  RR: 17 (01-08-25 @ 22:42) (17 - 20)  SpO2: 100% (01-08-25 @ 22:42) (100% - 100%)  Wt(kg): --  I&O's Summary      GENERAL: Patient is alert, awake, and in no acute distress  HEENT: Moist mucous membranes. No conjunctival injection. No JVD.   CHEST/LUNG: Clear to auscultation bilaterally; No wheezing. Unlabored respirations.  HEART: Regular rate and rhythm; No murmurs. Radial pulses 2+.  ABDOMEN: Bowel sounds present; Soft, Nontender, Nondistended.   EXTREMITIES:  No lower extremity tenderness. No lower extremity edema.  NEURO: Aox3, no focal deficits. Feels lightheaded when turning her head to the left.     -------------------------------------------------------------------------------------------  LABS:                          11.8   8.96  )-----------( 219      ( 08 Jan 2025 23:30 )             35.2     01-08    141  |  109[H]  |  11  ----------------------------<  115[H]  3.8   |  20[L]  |  0.60    Ca    9.2      08 Jan 2025 23:30  Mg     2.0     01-08    TPro  6.9  /  Alb  3.6  /  TBili  0.4  /  DBili  x   /  AST  29  /  ALT  25  /  AlkPhos  82  01-08    PT/INR - ( 09 Jan 2025 02:32 )   PT: 12.4 sec;   INR: 1.09 ratio         PTT - ( 09 Jan 2025 02:32 )  PTT:79.4 sec  CARDIAC MARKERS ( 09 Jan 2025 02:32 )  55 ng/L / x     / x     / x     / x     / x      CARDIAC MARKERS ( 08 Jan 2025 23:30 )  57 ng/L / x     / x     / x     / x     / 3.3 ng/mL              -------------------------------------------------------------------------------------------    -------------------------------------------------------------------------------------------

## 2025-01-09 NOTE — PATIENT PROFILE ADULT - FALL HARM RISK - HARM RISK INTERVENTIONS

## 2025-01-09 NOTE — H&P ADULT - ASSESSMENT
59 f with    Syncope  - telemetry  - cardiac enzymes  - Echo  - ASA  - Orthostatic VS  - MRI brain  - Cardiology evaluation  - Neurology evaluation    DVT prophylaxis    Further action as per clinical course     d/w patient, , ACP    Yuriy Penaloza MD phone [phone]

## 2025-01-09 NOTE — H&P ADULT - NSHPPHYSICALEXAM_GEN_ALL_CORE
PHYSICAL EXAMINATION:  Vital Signs Last 24 Hrs  T(C): 36.7 (09 Jan 2025 05:48), Max: 37.3 (09 Jan 2025 03:38)  T(F): 98 (09 Jan 2025 05:48), Max: 99.2 (09 Jan 2025 03:38)  HR: 71 (09 Jan 2025 07:05) (66 - 75)  BP: 112/66 (09 Jan 2025 07:05) (98/61 - 126/76)  BP(mean): 78 (09 Jan 2025 03:38) (78 - 78)  RR: 16 (09 Jan 2025 07:05) (15 - 20)  SpO2: 98% (09 Jan 2025 07:05) (98% - 100%)    Parameters below as of 09 Jan 2025 07:05  Patient On (Oxygen Delivery Method): room air      CAPILLARY BLOOD GLUCOSE          GENERAL: NAD   HEAD:  atraumatic, normocephalic  EYES: sclera anicteric  ENMT: mucous membranes moist  NECK: supple, No JVD  CHEST/LUNG: clear to auscultation bilaterally; no rales, rhonchi, or wheezing b/l  HEART: normal S1, S2  ABDOMEN: BS+, soft, ND, NT   EXTREMITIES:  pulses palpable; no clubbing, cyanosis, or edema b/l LEs  NEURO: awake, alert, interactive; moves all extremities  SKIN: no rashes or lesions

## 2025-01-09 NOTE — ASU PATIENT PROFILE, ADULT - FALL HARM RISK - HARM RISK INTERVENTIONS
Assistance with ambulation/Assistance OOB with selected safe patient handling equipment/Communicate Risk of Fall with Harm to all staff/Monitor gait and stability/Reinforce activity limits and safety measures with patient and family/Sit up slowly, dangle for a short time, stand at bedside before walking/Tailored Fall Risk Interventions/Visual Cue: Yellow wristband and red socks/Bed in lowest position, wheels locked, appropriate side rails in place/Call bell, personal items and telephone in reach/Instruct patient to call for assistance before getting out of bed or chair/Non-slip footwear when patient is out of bed/[place] to call system/Physically safe environment - no spills, clutter or unnecessary equipment/Purposeful Proactive Rounding/Room/bathroom lighting operational, light cord in reach

## 2025-01-09 NOTE — CONSULT NOTE ADULT - ATTENDING COMMENTS
During the evaluation, the patient's  was present, and I appreciated his involvement and support.    I interviewed the patient, discussed the case with the Resident and agree with the findings and plan as documented in the Resident's note except below.    During my evaluation, patient reported feeling much better and non focal exam.    Etiology of episode of unresponsiveness is uncertain but may be due to the syncope versus convulsive syncope. Patient will benefit from further inpatient work up to rule out treatable etiologies. Further workup and management will be guided by pending results.    [] VEEG  [] MRI Brain with seizure protocol as outpatient.  [] Syncope work up defer on primary team.  [] Continue medical management, neuro- check and fall precaution.  [] GI and DVT prophylaxis      LOC Precautions discussed:  1. No driving for 1 year from date of last seizure as per [place] law.  2. No taking a bath alone or showering with standing water > 2 inches  3. No swimming unsupervised  4. No use of automatic or semi-automatic firearms and no using other firearms unsupervised  5. No use of heavy machinery unsupervised  6. No cooking over an open flame on the front burners (back burners OK)  7. For additional recommendations please discuss with neurology or PCP as outpatient.    I discussed the diagnosis, treatment plan and prognosis with the patient. All questions and concerns were addressed. The patient demonstrated good understanding of the treatment plan.  My cumulative time taking care of this patient is 80 minutes  If you have any further questions, please do not hesitate to contact our consult service  Thank you for allowing us to participate in this patient care.
Syncope, possibly vasovagal i/s/o recent viral URI and decreased PO intake HOWEVER with the Troponin elevation, Diffuse TWI and slight elevation in AvR - will pursue with a cardiac cath

## 2025-01-09 NOTE — CONSULT NOTE ADULT - ASSESSMENT
Assessment and Recommendations:    #Syncope, possibly vasovagal i/s/o recent viral URI and decreased PO intake  #Troponin elevation, Diffuse TWI possibly 2/2 myocarditis   Patient presenting with a syncopal episode after a couple weeks of feeling malaise, cough, congestion consistent with a viral URI. She also had no PO intake the morning of the syncopal event, and she felt a bit lightheaded prior to passing out. Troponin was found to be elevated to 800s at [place] x2, here 50s x2. Given stable troponins, what sounds like viral URI, and diffuse TWI, CPK% <5, and no chest pain, suspect this is not ACS but rather could be myocarditis. However, given the risks of missing ACS, for now will continue with medical management and discuss in AM whether patient should go for [facility]. She also should be evaluated for major possible causes of syncope (vasovagal, orthostatic, arrhythmic, structural cardiac, ischemic, neurogenic, etc) given no clear etiology at this time.   Recs:  - s/p ASA load, continue with ASA 81mg daily  - on heparin gtt, continue for now  - trend troponins q2-4 hours to peak  - please obtain orthostatic vitals  - obtain formal TTE in AM  - please see attending attestation regarding further ischemic evaluation  - patient will benefit from a ziopatch on discharge (if no clear arrhythmias on telemetry while inpatient)  - obtain proBNP  - please obtain A1C, Lipid profile, LPa, TSH  - replete lytes for K >4.0, Mg >2.0  - monitor on telemetry  - please notify cardiology team if worsening chest pain, hemodynamic instability  - maintain active T&S, Hgb > 8; will monitor for s/s of bleeding           These are preliminary recommendations. Please see attending attestation for final recommendations.      Assessment and Recommendations:    #Syncope, possibly vasovagal i/s/o recent viral URI and decreased PO intake  #Troponin elevation, Diffuse TWI possibly 2/2 myocarditis   Patient presenting with a syncopal episode after a couple weeks of feeling malaise, cough, congestion consistent with a viral URI. She also had no PO intake the morning of the syncopal event, and she felt a bit lightheaded prior to passing out. Troponin was found to be elevated to 800s at [place] x2, here 50s x2. Given stable troponins, what sounds like viral URI, and diffuse TWI, CPK% <5, and no chest pain, suspect this is not ACS but rather could be myocarditis. However, given the risks of missing ACS, for now will continue with medical management and discuss in AM whether patient should go for [facility]. She also should be evaluated for major possible causes of syncope (vasovagal, orthostatic, arrhythmic, structural cardiac, ischemic, neurogenic, etc) given no clear etiology at this time.   Recs:  Update:  - given stable troponins, would stop heparin gtt and Aspirin for now. Patient's TWI could be indicative of cerebral T waves so would just repeat a CT head or MRI brain to make sure no central neurologic etiology for her syncope.  - please obtain orthostatic vitals  - obtain formal TTE in AM - please call to have this expedited  - please see attending attestation regarding further ischemic evaluation  - patient will benefit from a ziopatch on discharge (if no clear arrhythmias on telemetry while inpatient)  - obtain proBNP  - please obtain A1C, Lipid profile, LPa, TSH  - replete lytes for K >4.0, Mg >2.0  - monitor on telemetry  - please notify cardiology team if worsening chest pain, hemodynamic instability  - maintain active T&S, Hgb > 8; will monitor for s/s of bleeding           These are preliminary recommendations. Please see attending attestation for final recommendations.

## 2025-01-09 NOTE — H&P ADULT - NSHPREVIEWOFSYSTEMS_GEN_ALL_CORE
REVIEW OF SYSTEMS:    CONSTITUTIONAL: No weakness, fevers or chills  EYES/ENT: No visual changes;  No vertigo or throat pain   NECK: No pain or stiffness  RESPIRATORY: No cough, wheezing, hemoptysis; No shortness of breath  CARDIOVASCULAR: No chest pain or palpitations  GASTROINTESTINAL: No abdominal or epigastric pain. No nausea, vomiting, or hematemesis; No diarrhea or constipation. No melena or hematochezia.  GENITOURINARY: No dysuria, frequency or hematuria  NEUROLOGICAL: No numbness or weakness + headache  SKIN: No itching, burning, rashes, or lesions   All other review of systems is negative unless indicated above.

## 2025-01-09 NOTE — CONSULT NOTE ADULT - SUBJECTIVE AND OBJECTIVE BOX
Neurology - Consult Note    -  Spectra: [phone] ([facility]), [phone] ([facility])  -    HPI: Patient DAMIAN BRYANT is a 59y (1965) woman with no significant PMHx presenting as a transfer from [facility] for syncope and elevated troponin. Patient endorsed URI sx for prior couple of weeks. Had poor PO intake. 1/8 AM, was waiting for her food at the deli and felt lightheaded, and remembers ending up on the floor and losing consciousness. Of note, patient doesn't drink water often, and felt dehydrated. Denies urinary/bowel incontinence, tongue biting, convulsions. EKG at [facility] showed TWI in anterolateral leads. Troponin 848. Repeat EKG showed more diffuse TWI now including inferior leads. Patient has Fabry gene trait. Patient initially started on heparin gtt and Aspirin. Cardiology saw patient and recommended to stop both. Cardiology recommended neurology consult give that EKG findings of TWI could be indicative of cerebral T waves.      NIHSS:0      Review of Systems:    All other review of systems is negative unless indicated above.    Allergies:  No Known Allergies      PMHx/PSHx/Family Hx: As above, otherwise see below   No pertinent past medical history        Social Hx:  No current use of tobacco, alcohol, or illicit drugs  Lives with     Medications:  MEDICATIONS  (STANDING):  aspirin enteric coated 81 milliGRAM(s) Oral daily    MEDICATIONS  (PRN):  acetaminophen     Tablet .. 650 milliGRAM(s) Oral every 6 hours PRN Temp greater or equal to 38.5C (101.3F), Mild Pain (1 - 3)      Vitals:  T(C): 37.5 (01-09-25 @ 09:58), Max: 37.5 (01-09-25 @ 09:58)  HR: 69 (01-09-25 @ 09:58) (66 - 75)  BP: 117/75 (01-09-25 @ 09:58) (98/61 - 126/76)  RR: 18 (01-09-25 @ 09:58) (15 - 20)  SpO2: 98% (01-09-25 @ 09:58) (98% - 100%)    Physical Examination:   General - NAD  Cardiovascular - Peripheral pulses palpable, no edema  Eyes - Fundoscopy not well visualized    Neurologic Exam:  Mental status - Awake, Alert, Oriented to person, place, and time. Speech fluent, repetition and naming intact. Follows simple and complex commands. Attention/concentration, recent and remote memory (including registration and recall), and fund of knowledge intact    Cranial nerves - PERRLA, VFF, EOMI, face sensation (V1-V3) intact b/l, facial strength intact without asymmetry b/l, hearing intact b/l, palate with symmetric elevation, trapezius 5/5 strength b/l, tongue midline on protrusion with full lateral movement    Motor - Normal bulk and tone throughout. No pronator drift.  Strength testing            Deltoid      Biceps      Triceps     Wrist Extension    Wrist Flexion     Interossei         R            5                 5               5                     5                              5                        5                 5  L             5                 5               5                     5                              5                        5                 5              Hip Flexion    Hip Extension    Knee Flexion    Knee Extension    Dorsiflexion    Plantar Flexion  R              5                           5                       5                           5                            5                          5  L              5                           5                        5                           5                            5                          5    Sensation - Light touch intact throughout    DTR's -             Biceps      Triceps     Brachioradialis      Patellar    Ankle    Toes/plantar response  R             2+             2+                  2+                       2+            2+                 Down  L              2+             2+                 2+                        2+           2+                 Down    Coordination - Finger to Nose intact b/l. No tremors appreciated. Heel to shin intact b/l     Gait and station - KD given c/f safety     Labs:                        11.7   6.38  )-----------( 200      ( 09 Jan 2025 08:50 )             35.1     01-09    141  |  109[H]  |  8   ----------------------------<  109[H]  3.7   |  21[L]  |  0.58    Ca    9.3      09 Jan 2025 06:16  Mg     2.0     01-08    TPro  6.9  /  Alb  3.6  /  TBili  0.4  /  DBili  x   /  AST  29  /  ALT  25  /  AlkPhos  82  01-08    CAPILLARY BLOOD GLUCOSE        LIVER FUNCTIONS - ( 08 Jan 2025 23:30 )  Alb: 3.6 g/dL / Pro: 6.9 g/dL / ALK PHOS: 82 U/L / ALT: 25 U/L / AST: 29 U/L / GGT: x             PT/INR - ( 09 Jan 2025 02:32 )   PT: 12.4 sec;   INR: 1.09 ratio         PTT - ( 09 Jan 2025 08:50 )  PTT:67.0 sec  CSF:                  Radiology:

## 2025-01-10 ENCOUNTER — TRANSCRIPTION ENCOUNTER (OUTPATIENT)
Age: 60
End: 2025-01-10

## 2025-01-10 ENCOUNTER — APPOINTMENT (OUTPATIENT)
Dept: ELECTROPHYSIOLOGY | Facility: CLINIC | Age: 60
End: 2025-01-10

## 2025-01-10 VITALS — HEART RATE: 74 BPM | DIASTOLIC BLOOD PRESSURE: 68 MMHG | SYSTOLIC BLOOD PRESSURE: 110 MMHG

## 2025-01-10 DIAGNOSIS — R55 SYNCOPE AND COLLAPSE: ICD-10-CM

## 2025-01-10 PROBLEM — Z78.9 OTHER SPECIFIED HEALTH STATUS: Chronic | Status: ACTIVE | Noted: 2025-01-08

## 2025-01-10 LAB
ANION GAP SERPL CALC-SCNC: 15 MMOL/L — SIGNIFICANT CHANGE UP (ref 5–17)
BUN SERPL-MCNC: 10 MG/DL — SIGNIFICANT CHANGE UP (ref 7–23)
CALCIUM SERPL-MCNC: 9.1 MG/DL — SIGNIFICANT CHANGE UP (ref 8.4–10.5)
CHLORIDE SERPL-SCNC: 105 MMOL/L — SIGNIFICANT CHANGE UP (ref 96–108)
CO2 SERPL-SCNC: 19 MMOL/L — LOW (ref 22–31)
CREAT SERPL-MCNC: 0.58 MG/DL — SIGNIFICANT CHANGE UP (ref 0.5–1.3)
EGFR: 104 ML/MIN/1.73M2 — SIGNIFICANT CHANGE UP
GLUCOSE SERPL-MCNC: 76 MG/DL — SIGNIFICANT CHANGE UP (ref 70–99)
HCT VFR BLD CALC: 38.3 % — SIGNIFICANT CHANGE UP (ref 34.5–45)
HGB BLD-MCNC: 12.6 G/DL — SIGNIFICANT CHANGE UP (ref 11.5–15.5)
MCHC RBC-ENTMCNC: 29.7 PG — SIGNIFICANT CHANGE UP (ref 27–34)
MCHC RBC-ENTMCNC: 32.9 G/DL — SIGNIFICANT CHANGE UP (ref 32–36)
MCV RBC AUTO: 90.3 FL — SIGNIFICANT CHANGE UP (ref 80–100)
NRBC # BLD: 0 /100 WBCS — SIGNIFICANT CHANGE UP (ref 0–0)
PLATELET # BLD AUTO: 213 K/UL — SIGNIFICANT CHANGE UP (ref 150–400)
POTASSIUM SERPL-MCNC: 3.7 MMOL/L — SIGNIFICANT CHANGE UP (ref 3.5–5.3)
POTASSIUM SERPL-SCNC: 3.7 MMOL/L — SIGNIFICANT CHANGE UP (ref 3.5–5.3)
RBC # BLD: 4.24 M/UL — SIGNIFICANT CHANGE UP (ref 3.8–5.2)
RBC # FLD: 12.3 % — SIGNIFICANT CHANGE UP (ref 10.3–14.5)
SODIUM SERPL-SCNC: 139 MMOL/L — SIGNIFICANT CHANGE UP (ref 135–145)
WBC # BLD: 6.17 K/UL — SIGNIFICANT CHANGE UP (ref 3.8–10.5)
WBC # FLD AUTO: 6.17 K/UL — SIGNIFICANT CHANGE UP (ref 3.8–10.5)

## 2025-01-10 PROCEDURE — 84132 ASSAY OF SERUM POTASSIUM: CPT

## 2025-01-10 PROCEDURE — 80061 LIPID PANEL: CPT

## 2025-01-10 PROCEDURE — 85018 HEMOGLOBIN: CPT

## 2025-01-10 PROCEDURE — 80048 BASIC METABOLIC PNL TOTAL CA: CPT

## 2025-01-10 PROCEDURE — 93306 TTE W/DOPPLER COMPLETE: CPT

## 2025-01-10 PROCEDURE — 82330 ASSAY OF CALCIUM: CPT

## 2025-01-10 PROCEDURE — 95816 EEG AWAKE AND DROWSY: CPT | Mod: 26

## 2025-01-10 PROCEDURE — C1894: CPT

## 2025-01-10 PROCEDURE — 70553 MRI BRAIN STEM W/O & W/DYE: CPT | Mod: MC

## 2025-01-10 PROCEDURE — 85027 COMPLETE CBC AUTOMATED: CPT

## 2025-01-10 PROCEDURE — 36415 COLL VENOUS BLD VENIPUNCTURE: CPT

## 2025-01-10 PROCEDURE — 85730 THROMBOPLASTIN TIME PARTIAL: CPT

## 2025-01-10 PROCEDURE — 83880 ASSAY OF NATRIURETIC PEPTIDE: CPT

## 2025-01-10 PROCEDURE — 99285 EMERGENCY DEPT VISIT HI MDM: CPT | Mod: 25

## 2025-01-10 PROCEDURE — 84295 ASSAY OF SERUM SODIUM: CPT

## 2025-01-10 PROCEDURE — C1887: CPT

## 2025-01-10 PROCEDURE — 85014 HEMATOCRIT: CPT

## 2025-01-10 PROCEDURE — 84484 ASSAY OF TROPONIN QUANT: CPT

## 2025-01-10 PROCEDURE — 76376 3D RENDER W/INTRP POSTPROCES: CPT

## 2025-01-10 PROCEDURE — 84443 ASSAY THYROID STIM HORMONE: CPT

## 2025-01-10 PROCEDURE — 83036 HEMOGLOBIN GLYCOSYLATED A1C: CPT

## 2025-01-10 PROCEDURE — A9585: CPT

## 2025-01-10 PROCEDURE — 99233 SBSQ HOSP IP/OBS HIGH 50: CPT

## 2025-01-10 PROCEDURE — 93458 L HRT ARTERY/VENTRICLE ANGIO: CPT

## 2025-01-10 PROCEDURE — 85025 COMPLETE CBC W/AUTO DIFF WBC: CPT

## 2025-01-10 PROCEDURE — 82550 ASSAY OF CK (CPK): CPT

## 2025-01-10 PROCEDURE — 82553 CREATINE MB FRACTION: CPT

## 2025-01-10 PROCEDURE — 83605 ASSAY OF LACTIC ACID: CPT

## 2025-01-10 PROCEDURE — 82947 ASSAY GLUCOSE BLOOD QUANT: CPT

## 2025-01-10 PROCEDURE — 85610 PROTHROMBIN TIME: CPT

## 2025-01-10 PROCEDURE — 93356 MYOCRD STRAIN IMG SPCKL TRCK: CPT

## 2025-01-10 PROCEDURE — 80053 COMPREHEN METABOLIC PANEL: CPT

## 2025-01-10 PROCEDURE — 83735 ASSAY OF MAGNESIUM: CPT

## 2025-01-10 PROCEDURE — 82803 BLOOD GASES ANY COMBINATION: CPT

## 2025-01-10 PROCEDURE — C1769: CPT

## 2025-01-10 PROCEDURE — 95816 EEG AWAKE AND DROWSY: CPT

## 2025-01-10 PROCEDURE — 82435 ASSAY OF BLOOD CHLORIDE: CPT

## 2025-01-10 RX ORDER — B COMPLEX, C NO.20/FOLIC ACID 1 MG
1 CAPSULE ORAL
Refills: 0 | DISCHARGE

## 2025-01-10 RX ORDER — ACETAMINOPHEN 80 MG/.8ML
2 SOLUTION/ DROPS ORAL
Qty: 0 | Refills: 0 | DISCHARGE
Start: 2025-01-10

## 2025-01-10 RX ORDER — ASPIRIN 81 MG
1 TABLET, DELAYED RELEASE (ENTERIC COATED) ORAL
Qty: 30 | Refills: 0
Start: 2025-01-10 | End: 2025-02-08

## 2025-01-10 RX ADMIN — Medication 81 MILLIGRAM(S): at 13:35

## 2025-01-10 NOTE — DISCHARGE NOTE PROVIDER - PROVIDER TOKENS
PROVIDER:[TOKEN:[[id number]],FOLLOWUP:[1 month]],PROVIDER:[TOKEN:[[id number]],FOLLOWUP:[2 weeks]],PROVIDER:[TOKEN:[[id number]],FOLLOWUP:[2 weeks]]

## 2025-01-10 NOTE — PROGRESS NOTE ADULT - ASSESSMENT
Patient is a  59 year old Female with no PMH who presented to [place] for syncope and found to have elevated troponin. 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    
59 f with    Syncope  - telemetry no events  - Echo noted  - ASA  - Orthostatic VS  - MRI brain noted  - Cardiology evaluation noted.. Cleared for DC  - s/p cath nonobstructive  - Ziopatch  - EEG as OTP  - Neurology evaluation noted    DVT prophylaxis    DC home. Follow with PMD/ Cardiology/ Neurology in 3-4 days.     d/w patient, , ACP    Yuriy Penaloza MD phone [phone]   
ASSESSMENT: 60 y/o F w/ no significant PMHx presenting with episode of syncope in setting of dehydration and poor PO intake. Neuro exam nonfocal, CTH negative, MRI brain revealing multiple nonspecific abnormal white matter foci of T2/FLAIR prolongation   statistically favoring microvascular type changes however otherwise negative for acute intracranial pathology. Pt is pending EEG to r/o seizure.     IMPRESSION: Syncope likely iso dehydration given negative cardio workup and nonfocal neuro findings. Pending EEG.    RECOMMENDATIONS:  -Routine EEG given low likelihood of seizure  -Cardiology following, appreciate recs  -Continued syncope workup as per primary team

## 2025-01-10 NOTE — DISCHARGE NOTE PROVIDER - NSDCMRMEDTOKEN_GEN_ALL_CORE_FT
acetaminophen 325 mg oral tablet: 2 tab(s) orally every 6 hours As needed Temp greater or equal to 38.5C (101.3F), Mild Pain (1 - 3)  aspirin 81 mg oral delayed release tablet: 1 tab(s) orally once a day  Collagen Powder: Mix w/ Drink orally once a day  Multiple Vitamins oral tablet: 1 tab(s) orally once a day  Vitamin/Supplement: Calcium tablet, Vitamin K tablet, Vitamin D tablet, Vitamin C tablet: 1 tablet orally once a day   acetaminophen 325 mg oral tablet: 2 tab(s) orally every 6 hours As needed Temp greater or equal to 38.5C (101.3F), Mild Pain (1 - 3)  Collagen Powder: Mix w/ Drink orally once a day  Multiple Vitamins oral tablet: 1 tab(s) orally once a day  Vitamin/Supplement: Calcium tablet, Vitamin K tablet, Vitamin D tablet, Vitamin C tablet: 1 tablet orally once a day

## 2025-01-10 NOTE — DISCHARGE NOTE PROVIDER - HOSPITAL COURSE
HPI:   58 y/o female with no PMH here with syncope today fainted hit head in store, endorsed headache and dizziness, and nausea. troponin at [place] 865 CT head negative. she was given aspirin heparin and 1L IVF and reglan. on arrival denying any active sx of chest pain, n/v. EKG NSR, rsr' no interval prolongation. Transferred to [facility] for further work up. s/p recent URI.  at bedside. (09 Jan 2025 09:42)    Hospital Course:      Important Medication Changes and Reason:    Active or Pending Issues Requiring Follow-up:    Advanced Directives:   [ ] Full code  [ ] DNR  [ ] Hospice    Discharge Diagnoses:  syncope          HPI:   58 y/o female with no PMH here with syncope today fainted hit head in store, endorsed headache and dizziness, and nausea. troponin at [place] [facility] 865 CT head negative. she was given aspirin heparin and 1L IVF and reglan. on arrival denying any active sx of chest pain, n/v. EKG NSR, rsr' no interval prolongation. Transferred to [facility] for further work up. s/p recent URI.  at bedside. (09 Jan 2025 09:42)    Hospital Course:  A 59-year-old female with no significant past medical history presented with an episode of syncope, likely due to dehydration and poor oral intake. Initial evaluations, including a non-focal neurological exam and a negative CT head, showed no acute intracranial pathology. MRI brain results indicated nonspecific white matter changes, favoring microvascular-type changes, but no acute findings. Elevated troponin levels and diffuse T-wave inversions on ECG raised suspicions of possible myocarditis following a recent viral upper respiratory infection. Despite the stable troponin levels and lack of acute coronary syndrome (ACS) symptoms, a cardiac catheterization was performed, revealing non-obstructive coronary artery disease and a mild myocardial bridge. The patient is recommended to undergo a routine EEG to rule out seizures and continued syncope evaluation. Recommendations include stopping heparin and aspirin, obtaining orthostatic vitals, conducting a formal transthoracic echocardiogram, and considering a Ziopatch for arrhythmia monitoring. The patient is advised on precautions due to the syncope episode, including no driving for one year and avoiding activities with potential risks until further evaluation. The patient's condition will continue to be managed with medical oversight, and further investigations will follow pending results.     Seen Neurology PA, will do a spot EEG ,  patient can follow up out patient with neurology for result. Ziopatch was placed. Patient does not wish to stay in hospital, per attending   work up can be completed outpatient. Follow up with neurologist and Cardiologist     Important Medication Changes and Reason:  No new medication   Hold ASA for now     Active or Pending Issues Requiring Follow-up:  Outpatient MRI with seizure protocol to be ordered by neurology   Outpatient follow up with cardiology s/p Ziopatch placement   Outpatient follow up with neurology     Advanced Directives:   [x] Full code  [ ] DNR  [ ] Hospice    Discharge Diagnoses:  syncope   possible seizure

## 2025-01-10 NOTE — EEG REPORT - NS EEG TEXT BOX
DAMIAN BRYANT MRN-[id number] 59y (1965)F  Admitting MD: Dr. Yuriy Penaloza    Study Date: 01-10-25    --------------------------------------------------------------------------------------------------  History:  CC/ HPI Patient is a 59y old  Female who presents with a chief complaint of Syncope (10 Giovanny 2025 15:50)      --------------------------------------------------------------------------------------------------  Study Interpretation:    [[[Abbreviation Key:  PDR=alpha rhythm/posterior dominant rhythm. A-P=anterior posterior gradient.  Amplitude: ‘very low’:<20; ‘low’:20-50; ‘medium’:; ‘high’:>200uV.  Persistence for periodic/rhythmic patterns (% of epoch) ‘rare’:<1%; ‘occasional’:1-10%; ‘frequent’:10-50%; ‘abundant’:50-90%; ‘continuous’:>90%.  Persistence for sporadic discharges: ‘rare’:<1/hr; ‘occasional’:1/min-1/hr; ‘frequent’:>1/min; ‘abundant’:>1/10 sec.  GRDA=generalized rhythmic delta activity; FIRDA=frontal intermittent GRDA; LRDA=lateralized rhythmic delta activity; TIRDA=temporal intermittent rhythmic delta activity;  LPD=PLED=lateralized periodic discharges; GPD=generalized periodic discharges; BiPDs=BiPLEDs=bilateral independent periodic epileptiform discharges; SIRPID=stimulus induced rhythmic, periodic, or ictal appearing discharges; BIRDs=brief potentially ictal rhythmic discharges >4 Hz, lasting .5-10s; PFA=paroxysmal bursts of beta/gamma; LVFA=low voltage fast activity.  Modifiers: +F=with fast component; +S=with spike component; +R=with rhythmic component.  S-B=burst suppression pattern.  Max=maximal. N1-drowsy; N2-stage II sleep; N3-slow wave sleep. SSS/BETS=small sharp spikes/benign epileptiform transients of sleep. HV=hyperventilation; PS=photic stimulation]]]    FINDINGS:  The background was continuous, spontaneously variable and reactive.  During wakefulness, the posteriorly dominant rhythm consisted of symmetric, well modulated 10-10.5 Hz activity, with an amplitude to 40 uV, that attenuated to eye opening.  Low amplitude central beta was noted in wakefulness.    Background Slowing:  Generalized slowing: none was present.  Focal slowing: none was present.    Sleep Background:  -Drowsiness was characterized by fragmentation, attenuation, and slowing of the background activity.  Some shifting temporal theta intrusion  -N2 sleep transients were not recorded.    Epileptiform Activity:   No interictal epileptiform discharges were present.    Events:  No clinical events were recorded.  No seizures were recorded.    Activation Procedures:   -Photic stimulation was not performed.  -Hyperventilation was performed and did not elicit any abnormalities.     There was mild accentuation of fast activity, and an increase in diffuse polymorphic slowing.      Artifacts:  Intermittent myogenic and external motion artifacts were noted.    ECG:  The heart rate on single channel ECG at baseline was predominantly near BPM = 60-70  -----------------------------------------------------------------------------------------------------    EEG Classification / Summary:  normal EEG study, awake / drowsy   -  -----------------------------------------------------------------------------------------------------    Clinical Impression:  No definite epileptiform pattern or seizures recorded, which does not exclude the diagnosis of a seizure disorder.  Consider a more prolonged follow up EEG recording if there is a persistent clinical concern for seizures.    -------------------------------------------------------------------------------------------------------  [facility] EEG Reading Room Ph#: [phone]  Epilepsy Answering Service after 5PM and before 8:30AM: Ph#: [phone]    Kareem Suazo M.D.   of Neurology, [facility]

## 2025-01-10 NOTE — DISCHARGE NOTE PROVIDER - NSDCFUSCHEDAPPT_GEN_ALL_CORE_FT
Frank Driver  [facility]  [address] [facility]  Scheduled Appointment: 03/05/2025

## 2025-01-10 NOTE — PROGRESS NOTE ADULT - SUBJECTIVE AND OBJECTIVE BOX
NEUROLOGY FOLLOW-UP CONSULT NOTE    RFC: syncope    Interval history: No acute neurologic events overnight. Patient reports she is feeling well, has been hydrating well and denies any further episodes of lightheadedness or syncope since admission. Orthostatics negative, cardio recommending outpatient cardiac monitor to r/o arrythmia. MRI brain negative, pt denies any neurologic complaints at this time. Is still pending vEEG, expresses she is not agreeable to 24hr EEG however is ok with getting routine 20 min EEG instead.     Meds:  MEDICATIONS  (STANDING):  aspirin enteric coated 81 milliGRAM(s) Oral daily    MEDICATIONS  (PRN):  acetaminophen     Tablet .. 650 milliGRAM(s) Oral every 6 hours PRN Temp greater or equal to 38.5C (101.3F), Mild Pain (1 - 3)      PMHx/PSHx/FHx/SHx:  Syncope and collapse    Handoff    MEWS Score    No pertinent past medical history    No pertinent past medical history    Syncope    No significant past surgical history    NSTEMI TRANSFER    SysAdmin_VstLnk      Allergies:  No Known Allergies      ROS: All systems negative except as documented in Interval history    O:  T(C): 36.9 (01-10-25 @ 11:00), Max: 37.4 (01-09-25 @ 19:30)  HR: 64 (01-10-25 @ 11:00) (55 - 77)  BP: 111/73 (01-10-25 @ 11:00) (96/56 - 133/65)  RR: 18 (01-10-25 @ 11:00) (16 - 18)  SpO2: 100% (01-10-25 @ 11:00) (94% - 100%)    Focused neurologic exam:  MS - AAO x3, speech fluent, rep/naming intact, follows commands, attn/conc/recent and remote memory/fund of knowledge WNL  CN - PERRLA, EOMI, VFF, face sens/str/hearing WNL b/l, tongue/palate midline, trap 5/5 b/l  Motor - Normal bulk/tone, 5/5 all  Sens - LT intact all  DTR's - 2+ all and downgoing b/l plantar response  Coord - FtN intact b/l  Gait and station - deferred d/t focused neurological exam    Pertinent labs/studies:    LABS:                         12.6   6.17  )-----------( 213      ( 10 Giovanny 2025 07:31 )             38.3     01-10    139  |  105  |  10  ----------------------------<  76  3.7   |  19[L]  |  0.58    Ca    9.1      10 Giovanny 2025 07:31  Mg     2.0     01-08    TPro  6.9  /  Alb  3.6  /  TBili  0.4  /  DBili  x   /  AST  29  /  ALT  25  /  AlkPhos  82  01-08    PT/INR - ( 09 Jan 2025 02:32 )   PT: 12.4 sec;   INR: 1.09 ratio         PTT - ( 09 Jan 2025 08:50 )  PTT:67.0 sec  Urinalysis Basic - ( 10 Giovanny 2025 07:31 )    Color: x / Appearance: x / SG: x / pH: x  Gluc: 76 mg/dL / Ketone: x  / Bili: x / Urobili: x   Blood: x / Protein: x / Nitrite: x   Leuk Esterase: x / RBC: x / WBC x   Sq Epi: x / Non Sq Epi: x / Bacteria: x            RADIOLOGY, EKG & ADDITIONAL TESTS: Reviewed.       < from: MR Head w/wo IV Cont (01.09.25 @ 22:37) >      < end of copied text >  < from: MR Head w/wo IV Cont (01.09.25 @ 22:37) >  IMPRESSION: No acute intracranial hemorrhage, acute ischemia, or abnormal   intracranial enhancement.    Multiple nonspecific abnormal white matter foci of T2/FLAIR prolongation   statistically favoring microvascular type changes.    --- End of Report ---    < end of copied text >  
Patient is a 59y old  Female who presents with a chief complaint of Syncope (10 Giovanny 2025 15:50)      SUBJECTIVE / OVERNIGHT EVENTS: feels better. Wants to go home.  at bedside.  Review of Systems  chest pain no  palpitations no  sob no  nausea no  headache no    MEDICATIONS  (STANDING):  aspirin enteric coated 81 milliGRAM(s) Oral daily    MEDICATIONS  (PRN):  acetaminophen     Tablet .. 650 milliGRAM(s) Oral every 6 hours PRN Temp greater or equal to 38.5C (101.3F), Mild Pain (1 - 3)      Vital Signs Last 24 Hrs  T(C): 36.9 (10 Giovanny 2025 11:00), Max: 36.9 (10 Giovanny 2025 11:00)  T(F): 98.4 (10 Giovanny 2025 11:00), Max: 98.4 (10 Giovanny 2025 11:00)  HR: 74 (10 Giovanny 2025 16:43) (55 - 74)  BP: 110/68 (10 Giovanny 2025 16:43) (110/68 - 129/72)  BP(mean): --  RR: 18 (10 Giovanny 2025 11:00) (18 - 18)  SpO2: 100% (10 Giovanny 2025 11:00) (99% - 100%)    Parameters below as of 10 Giovanny 2025 11:00  Patient On (Oxygen Delivery Method): room air        PHYSICAL EXAM:  GENERAL: NAD, well-developed  HEAD:  Atraumatic, Normocephalic  EYES: EOMI, PERRLA, conjunctiva and sclera clear  NECK: Supple, No JVD  CHEST/LUNG: Clear to auscultation bilaterally; No wheeze  HEART: Regular rate and rhythm; No murmurs, rubs, or gallops  ABDOMEN: Soft, Nontender, Nondistended; Bowel sounds present  EXTREMITIES:  2+ Peripheral Pulses, No clubbing, cyanosis, or edema  PSYCH: AAOx3  NEUROLOGY: non-focal  SKIN: No rashes or lesions    LABS:                        12.6   6.17  )-----------( 213      ( 10 Giovanny 2025 07:31 )             38.3     01-10    139  |  105  |  10  ----------------------------<  76  3.7   |  19[L]  |  0.58    Ca    9.1      10 Giovanny 2025 07:31  Mg     2.0     01-08    TPro  6.9  /  Alb  3.6  /  TBili  0.4  /  DBili  x   /  AST  29  /  ALT  25  /  AlkPhos  82  01-08    PT/INR - ( 09 Jan 2025 02:32 )   PT: 12.4 sec;   INR: 1.09 ratio         PTT - ( 09 Jan 2025 08:50 )  PTT:67.0 sec  CARDIAC MARKERS ( 09 Jan 2025 14:44 )  x     / x     / x     / x     / 3.9 ng/mL  CARDIAC MARKERS ( 09 Jan 2025 06:16 )  x     / x     / x     / x     / 3.6 ng/mL  CARDIAC MARKERS ( 08 Jan 2025 23:30 )  x     / x     / x     / x     / 3.3 ng/mL      Urinalysis Basic - ( 10 Giovanny 2025 07:31 )    Color: x / Appearance: x / SG: x / pH: x  Gluc: 76 mg/dL / Ketone: x  / Bili: x / Urobili: x   Blood: x / Protein: x / Nitrite: x   Leuk Esterase: x / RBC: x / WBC x   Sq Epi: x / Non Sq Epi: x / Bacteria: x    < from: TTE W or WO Ultrasound Enhancing Agent (01.09.25 @ 11:55) >  CONCLUSIONS:      1. Left ventricular systolic function is normal with an ejection fraction of 65 % by 3D. There are no regional wall motion abnormalities seen.   2. Normal left ventricular diastolic function.   3. Left ventricular global longitudinal strain is -24.6 % which is normal (< -18%).   4. Normal right ventricular cavity size and normal right ventricular systolic function.   5. No significant valvular disease.   6. No pericardial effusion seen.   7. No prior echocardiogram is available for comparison.    < end of copied text >        RADIOLOGY & ADDITIONAL TESTS:    Imaging Personally Reviewed:  < from: MR Head w/wo IV Cont (01.09.25 @ 22:37) >  IMPRESSION: No acute intracranial hemorrhage, acute ischemia, or abnormal   intracranial enhancement.    Multiple nonspecific abnormal white matter foci of T2/FLAIR prolongation   statistically favoring microvascular type changes.    < end of copied text >    Consultant(s) Notes Reviewed:      Care Discussed with Consultants/Other Providers:  
S/24 Events: Patient seen and examined. Denies CP, SOB, dizziness, LH, near syncope or sycope.   No acute events overnight.   Telemetry : -  O:  T(C): 36.8 (01-10-25 @ 04:26), Max: 37.5 (01-09-25 @ 09:58)  HR: 70 (01-10-25 @ 04:26) (55 - 77)  BP: 110/70 (01-10-25 @ 04:26) (96/56 - 133/65)  RR: 18 (01-10-25 @ 04:26) (16 - 18)  SpO2: 99% (01-10-25 @ 04:26) (94% - 100%)    Daily Height in cm: 152.4 (09 Jan 2025 15:48)    Daily   Gen: NAD  HEENT: EOMI  CV: RRR, normal S1 + S2, no m/r/g  Lungs: CTAB  Abd: soft, non-tender  Ext: No edema    Labs:                        12.6   6.17  )-----------( 213      ( 10 Giovanny 2025 07:31 )             38.3     01-10    139  |  105  |  10  ----------------------------<  76  3.7   |  19[L]  |  0.58    Ca    9.1      10 Giovanny 2025 07:31  Mg     2.0     01-08    TPro  6.9  /  Alb  3.6  /  TBili  0.4  /  DBili  x   /  AST  29  /  ALT  25  /  AlkPhos  82  01-08    PT/INR - ( 09 Jan 2025 02:32 )   PT: 12.4 sec;   INR: 1.09 ratio         PTT - ( 09 Jan 2025 08:50 )  PTT:67.0 sec  CARDIAC MARKERS ( 09 Jan 2025 14:44 )  x     / x     / x     / x     / 3.9 ng/mL  CARDIAC MARKERS ( 09 Jan 2025 06:16 )  x     / x     / x     / x     / 3.6 ng/mL  CARDIAC MARKERS ( 08 Jan 2025 23:30 )  x     / x     / x     / x     / 3.3 ng/mL  CARDIAC MARKERS ( 08 Jan 2025 19:54 )  x     / x     / x     / x     / 2.2 ng/mL       DIagnostics :       Meds:  MEDICATIONS  (STANDING):  aspirin enteric coated 81 milliGRAM(s) Oral daily      A/P:

## 2025-01-10 NOTE — DISCHARGE NOTE NURSING/CASE MANAGEMENT/SOCIAL WORK - FINANCIAL ASSISTANCE
[facility] provides services at a reduced cost to those who are determined to be eligible through [facility]’s financial assistance program. Information regarding [facility]’s financial assistance program can be found by going to [email or web address] or by calling [phone].

## 2025-01-10 NOTE — DISCHARGE NOTE PROVIDER - CARE PROVIDER_API CALL
Radha Davidson  Cardiovascular Disease  [address], [address] [place] [id number]  Phone: [phone]  Fax: [phone]  Follow Up Time: 1 month    Vern Atkinson  Neurology  [address] [facility], [address]  [place] [address]  Phone: [phone]  Fax: [phone]  Follow Up Time: 2 weeks    Hoang Mcdaniels  Cardiology  [address] [place] [address]  Phone: [phone]  Fax: [phone]  Follow Up Time: 2 weeks

## 2025-01-10 NOTE — DISCHARGE NOTE PROVIDER - NSDCCPCAREPLAN_GEN_ALL_CORE_FT
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  s/p Ziopatch placed   Outpatient follow up with cardiology      SECONDARY DISCHARGE DIAGNOSES  Diagnosis: Seizure  Assessment and Plan of Treatment: Syncopal possible related to seizure   MRI Brain with seizure protocol as outpatient.  outpatient follow up with neurology   Seizure Precautions:  1. No driving for 1 year from date of last seizure as per [place] law.  2. No taking a bath alone or showering with standing water > 2 inches  3. No swimming unsupervised  4. No use of automatic or semi-automatic firearms and no using other firearms unsupervised  5. No use of heavy machinery unsupervised  6. No cooking over an open flame on the front burners (back burners OK)  7. For additional recommendations please discuss with neurology or PCP as outpatient.

## 2025-01-10 NOTE — DISCHARGE NOTE NURSING/CASE MANAGEMENT/SOCIAL WORK - PATIENT PORTAL LINK FT
You can access the FollowMyHealth Patient Portal offered by [facility] by registering at the following website: [email or web address]. By joining [employer]’s FollowMyHealth portal, you will also be able to view your health information using other applications (apps) compatible with our system.

## 2025-01-10 NOTE — DISCHARGE NOTE PROVIDER - CARE PROVIDERS DIRECT ADDRESSES
1 or 2
[email or web address]

## 2025-01-13 ENCOUNTER — TRANSCRIPTION ENCOUNTER (OUTPATIENT)
Age: 60
End: 2025-01-13

## 2025-01-29 PROBLEM — Z78.9 OTHER SPECIFIED HEALTH STATUS: Chronic | Status: ACTIVE | Noted: 2025-01-09

## 2025-02-10 PROCEDURE — 93228 REMOTE 30 DAY ECG REV/REPORT: CPT

## 2025-02-25 NOTE — ED ADULT NURSE NOTE - CAS DISCH BELONGINGS RETURNED
Return if increased swelling of your face or mouth or problems swallowing or breathing or enlargement of your tongue or any shortness of breath    STOP the Cephalexin   Not applicable

## 2025-03-05 ENCOUNTER — APPOINTMENT (OUTPATIENT)
Dept: OTOLARYNGOLOGY | Facility: CLINIC | Age: 60
End: 2025-03-05

## 2025-03-17 ENCOUNTER — APPOINTMENT (OUTPATIENT)
Dept: OBGYN | Facility: CLINIC | Age: 60
End: 2025-03-17
Payer: COMMERCIAL

## 2025-03-17 PROCEDURE — 76830 TRANSVAGINAL US NON-OB: CPT

## 2025-03-17 PROCEDURE — 82270 OCCULT BLOOD FECES: CPT

## 2025-03-17 PROCEDURE — 81002 URINALYSIS NONAUTO W/O SCOPE: CPT

## 2025-03-17 PROCEDURE — 99396 PREV VISIT EST AGE 40-64: CPT | Mod: 25

## 2025-03-17 PROCEDURE — 99459 PELVIC EXAMINATION: CPT

## 2025-06-09 NOTE — DISCHARGE NOTE PROVIDER - NSDCCONDITION_GEN_ALL_CORE
improved  
"Patient has Combined Systolic and Diastolic heart failure that is Acute on chronic. On presentation their CHF was well compensated. Most recent BNP and echo results are listed below.  No results for input(s): "BNP" in the last 72 hours.  Latest ECHO  Results for orders placed during the hospital encounter of 06/03/25    Echo    Interpretation Summary    Left Ventricle: The left ventricle is mildly dilated. Normal wall thickness. Moderate global hypokinesis present. There is moderately reduced systolic function with a visually estimated ejection fraction of 35 - 40%.    Right Ventricle: The right ventricle is normal in size Systolic function is normal.    Mitral Valve: There is mild regurgitation.    Tricuspid Valve: There is mild regurgitation.    Limited view study.    Current Heart Failure Medications  sacubitriL-valsartan 24-26 mg per tablet 1 tablet, 2 times daily, Oral  furosemide tablet 20 mg, Every morning, Oral  dapagliflozin propanediol (Farxiga) tablet 10 mg, Daily, Oral  , Daily, Oral  , 2 times daily, Oral    Plan  D/c home f/u with Dr. Jeffery Khalil and Entresto at d/c       "
Completed iv abx  F/u with pcp and pulm    Antibiotics (From admission, onward)      None            Microbiology Results (last 7 days)       ** No results found for the last 168 hours. **          
Steroid taper at d/c  Continue nebs at home  F/u with pulm  
Stable